# Patient Record
Sex: MALE | Race: NATIVE HAWAIIAN OR OTHER PACIFIC ISLANDER
[De-identification: names, ages, dates, MRNs, and addresses within clinical notes are randomized per-mention and may not be internally consistent; named-entity substitution may affect disease eponyms.]

---

## 2018-11-09 ENCOUNTER — HOSPITAL ENCOUNTER (INPATIENT)
Dept: HOSPITAL 31 - C.ER | Age: 53
LOS: 5 days | Discharge: ANOTHER HOSPITAL | DRG: 287 | End: 2018-11-14
Attending: INTERNAL MEDICINE | Admitting: INTERNAL MEDICINE
Payer: COMMERCIAL

## 2018-11-09 VITALS — BODY MASS INDEX: 22.4 KG/M2

## 2018-11-09 DIAGNOSIS — I10: ICD-10-CM

## 2018-11-09 DIAGNOSIS — I25.110: Primary | ICD-10-CM

## 2018-11-09 DIAGNOSIS — I35.2: ICD-10-CM

## 2018-11-09 DIAGNOSIS — E78.5: ICD-10-CM

## 2018-11-09 LAB
ALBUMIN SERPL-MCNC: 4.8 [, G/DL] (ref 3.5–5)
ALBUMIN/GLOB SERPL: 1.4 [,] (ref 1–2.1)
ALT SERPL-CCNC: 38 [, U/L] (ref 21–72)
AST SERPL-CCNC: 56 [, U/L] (ref 17–59)
BASOPHILS # BLD AUTO: 0 [, K/UL] (ref 0–0.2)
BASOPHILS NFR BLD: 0.5 [, %] (ref 0–2)
BNP SERPL-MCNC: 681 [, PG/ML] (ref 0–900)
BUN SERPL-MCNC: 18 [, MG/DL] (ref 9–20)
CALCIUM SERPL-MCNC: 9 [, MG/DL] (ref 8.6–10.4)
EOSINOPHIL # BLD AUTO: 0.4 [, K/UL] (ref 0–0.7)
EOSINOPHIL NFR BLD: 6 [, %] (ref 0–4)
ERYTHROCYTE [DISTWIDTH] IN BLOOD BY AUTOMATED COUNT: 13.2 [, %] (ref 11.5–14.5)
GFR NON-AFRICAN AMERICAN: > 60 [,]
HGB BLD-MCNC: 14.4 [, G/DL] (ref 12–18)
LYMPHOCYTES # BLD AUTO: 2.3 [, K/UL] (ref 1–4.3)
LYMPHOCYTES NFR BLD AUTO: 33.9 [, %] (ref 20–40)
MCH RBC QN AUTO: 27.9 [, PG] (ref 27–31)
MCHC RBC AUTO-ENTMCNC: 33.1 [, G/DL] (ref 33–37)
MCV RBC AUTO: 84.1 [, FL] (ref 80–94)
MONOCYTES # BLD: 0.5 [, K/UL] (ref 0–0.8)
MONOCYTES NFR BLD: 6.9 [, %] (ref 0–10)
NEUTROPHILS # BLD: 3.5 [, K/UL] (ref 1.8–7)
NEUTROPHILS NFR BLD AUTO: 52.7 [, %] (ref 50–75)
NRBC BLD AUTO-RTO: 0.2 [, %] (ref 0–2)
PLATELET # BLD: 219 [, K/UL] (ref 130–400)
PMV BLD AUTO: 8 [, FL] (ref 7.2–11.7)
RBC # BLD AUTO: 5.18 [, MIL/UL] (ref 4.4–5.9)
WBC # BLD AUTO: 6.7 [, K/UL] (ref 4.8–10.8)

## 2018-11-09 RX ADMIN — ENOXAPARIN SODIUM SCH MG: 60 INJECTION SUBCUTANEOUS at 22:46

## 2018-11-09 NOTE — C.PDOC
History Of Present Illness


54 y/o male presents to ED sent by Dr. Grant for evaluation of abnormal EKG and 

with c/o chest tightness intermittent for 1 month. Patient was sent to Dr. Grant 

office by PMD for evaluation of heart murmur. Patient states he took Aspirin 

81mg today and denies history of Stroke or MI. Patient denies dark or bloody 

stool, fever, chills, abdominal pain, back pain, nausea, vomiting, leg swelling 

or any other complaints at this time.


PMD: JOON Bailey


Cardiologist: Dr. Grant


Time Seen by Provider: 11/09/18 15:48


Chief Complaint (Nursing): Chest Pain


History Per: Patient


History/Exam Limitations: no limitations


Onset/Duration Of Symptoms: Days


Current Symptoms Are (Timing): Still Present





Past Medical History


Reviewed: Historical Data, Nursing Documentation, Vital Signs


Vital Signs: 





                                Last Vital Signs











Temp  99.1 F   11/09/18 15:40


 


Pulse  83   11/09/18 15:40


 


Resp  19   11/09/18 15:40


 


BP  136/96 H  11/09/18 15:40


 


Pulse Ox  99   11/09/18 15:40














- Medical History


PMH: No Chronic Diseases


Surgical History: No Surg Hx


Family History: States: No Known Family Hx





- Social History


Hx Alcohol Use: No


Hx Substance Use: No





- Immunization History


Hx Tetanus Toxoid Vaccination: No


Hx Influenza Vaccination: No


Hx Pneumococcal Vaccination: No





Review Of Systems


Constitutional: Negative for: Fever, Chills


Cardiovascular: Positive for: Chest Pain.  Negative for: Orthopnea


Respiratory: Negative for: Cough, Shortness of Breath


Gastrointestinal: Negative for: Nausea, Vomiting, Hematochezia





Physical Exam





- Physical Exam


Appears: Non-toxic, No Acute Distress


Skin: Warm, Dry, No Rash


Head: Atraumatic, Normacephalic


Eye(s): bilateral: Normal Inspection


Oral Mucosa: Moist


Neck: Supple


Cardiovascular: No Edema, Murmur (systolic)


Respiratory: Normal Breath Sounds, No Rales, No Rhonchi, No Wheezing


Gastrointestinal/Abdominal: Soft, No Tenderness, No Guarding, No Rebound


Extremity: Normal ROM, No Pedal Edema, Capillary Refill (<2 seconds)


Neurological/Psych: Oriented x3, Normal Speech (Speaking in full sentences), 

Normal Cognition





ED Course And Treatment





- Laboratory Results


Result Diagrams: 


                                 11/09/18 16:00





                                 11/09/18 16:00


O2 Sat by Pulse Oximetry: 99 (RA)


Pulse Ox Interpretation: Normal





Medical Decision Making


Medical Decision Making: 


Plan:CXR, ECG, Blood work, ordered








ECG: NSR @71BPM, ST ELEVATIONS IN V2, V3. T WAVE INVERSION IN LATERAL LEADS, V5,

V6 AND LEAD 1





Progress:


Discussed ECG with Dr. Grant states no STEMI at this time and to call him back 

with lab results. 





1715 


labs unremarkable. no dark or bloody stool per pt. Remained ASA given no 

mediastinal widening. 


appreciate consult w/ Dr. Grant: heparin to be started. pt in NAD 


appreciate consult w/ Dr. Block: to admit to his service 





Disposition





- Disposition


Disposition Time: 17:15


Condition: GOOD


Forms:  [facility] (English)





- Clinical Impression


Clinical Impression: 


 Unstable angina








- Scribe Statement


The provider has reviewed the documentation as recorded by the Scribe


Santos Mantilla





All medical record entries made by the Scribe were at my direction and 

personally dictated by me. I have reviewed the chart and agree that the record 

accurately reflects my personal performance of the history, physical exam, 

medical decision making, and the department course for this patient. I have also

personally directed, reviewed, and agree with the discharge instructions and 

disposition.

## 2018-11-09 NOTE — RAD
Date of service: 



11/09/2018



HISTORY:

Chest pain 



COMPARISON:

03/31/2015.



TECHNIQUE:

Chest PA and lateral



FINDINGS:



LINES AND TUBES:

None. 



LUNG AND PLEURA:

The lungs are well inflated and clear. No pleural effusion or 

pneumothorax.



HEART AND MEDIASTINUM:

The heart is not enlarged.  No aortic atherosclerotic calcification 

present.  The hilar and mediastinal contours are within normal limits.



SKELETAL STRUCTURES:

The bony structures are within normal limits for the patient's age.



VISUALIZED UPPER ABDOMEN:

Normal.



OTHER FINDINGS:

None.



IMPRESSION:

No active pulmonary disease.

## 2018-11-10 RX ADMIN — ENOXAPARIN SODIUM SCH: 60 INJECTION SUBCUTANEOUS at 09:07

## 2018-11-10 NOTE — CP.PCM.CON
History of Present Illness





- History of Present Illness


History of Present Illness: 





52 y/o male presented to chest tightness intermittent for 1 month. Patient was 

sent to my office by PMD for evaluation of heart murmur. Patient states he took 

Aspirin 81mg today and denies history of Stroke or MI. Patient denies dark or 

bloody stool, fever, chills, abdominal pain, back pain, nausea, vomiting, leg 

swelling or any other complaints at this time.


PMD: JOON Bailey





Chief Complaint (Nursing): Chest Pain


History Per: Patient


History/Exam Limitations: no limitations


Onset/Duration Of Symptoms: Days


Current Symptoms Are (Timing): Still Present








- Medical History


PMH: No Chronic Diseases


Surgical History: No Surg Hx


Family History: States: No Known Family Hx





- Social History


Hx Alcohol Use: No


Hx Substance Use: No





- Immunization History


Hx Tetanus Toxoid Vaccination: No


Hx Influenza Vaccination: No


Hx Pneumococcal Vaccination: No





 Review Of Systems 


Constitutional: Negative for: Fever, Chills


Cardiovascular: Positive for: Chest Pain.  Negative for: Orthopnea


Respiratory: Negative for: Cough, Shortness of Breath


Gastrointestinal: Negative for: Nausea, Vomiting, Hematochezia





 Physical Exam 





- Physical Exam


Appears: Non-toxic, No Acute Distress


Skin: Warm, Dry, No Rash


Head: Atraumatic, Normacephalic


Eye(s): bilateral: Normal Inspection


Oral Mucosa: Moist


Neck: Supple


Cardiovascular: No Edema, Murmur (systolic)


Respiratory: Normal Breath Sounds, No Rales, No Rhonchi, No Wheezing


Gastrointestinal/Abdominal: Soft, No Tenderness, No Guarding, No Rebound


Extremity: Normal ROM, No Pedal Edema, Capillary Refill (<2 seconds)


Neurological/Psych: Oriented x3, Normal Speech (Speaking in full sentences), 

Normal Cognition








Past Patient History





- Tetanus Immunizations


Tetanus Immunization: Unknown





- Past Medical History & Family History


Past Medical History?: No





- Past Social History


Smoking Status: Never Smoked





- MUSCULOSKELETAL/RHEUMATOLOGICAL


Hx Falls: No





- PSYCHIATRIC


Hx Substance Use: No





- SURGICAL HISTORY


Hx Surgeries: No





- ANESTHESIA


Hx Anesthesia: No





Meds


Allergies/Adverse Reactions: 


                                    Allergies











Allergy/AdvReac Type Severity Reaction Status Date / Time


 


No Known Allergies Allergy   Verified 11/09/18 15:45














- Medications


Medications: 


                               Current Medications





Enoxaparin Sodium (Lovenox)  60 mg SC Q12 [facility]


   Last Admin: 11/10/18 09:07 Dose:  Not Given


Metoprolol Tartrate (Lopressor)  [facility]


   Last Admin: 11/10/18 17:54 Dose:  25 mg











Results





- Vital Signs


Recent Vital Signs: 


                                Last Vital Signs











Temp  97.7 F   11/10/18 15:00


 


Pulse  65   11/10/18 16:29


 


Resp  20   11/10/18 15:00


 


BP  129/89   11/10/18 17:54


 


Pulse Ox  100   11/10/18 15:00














- Labs


Result Diagrams: 


                                 11/09/18 16:00





                                 11/09/18 16:00





Assessment & Plan





- Assessment and Plan (Free Text)


Assessment: 





Severe symptomatic Aortic stenosis


Likely Bicuspid valve





Need to r/o aortic pathology including Aortic aneurysm, Dissection and 

Coarctation


CT angio chest 


R/O CAD








Recommend AVR


Cardiac cath Monday

## 2018-11-10 NOTE — CP.PCM.HP
History of Present Illness





- History of Present Illness


History of Present Illness: 





CC: abnormal EKG 


HPI: 53 year old male , no past medical illness. Seen in ER for abnormal EKG> 

feels anxious.





Present on Admission





- Present on Admission


Any Indicators Present on Admission: Yes


History of DVT/PE: No


History of Uncontrolled Diabetes: No


Urinary Catheter: No


Decubitus Ulcer Present: No


History Surgical Site Infection Following: None





Review of Systems





- Review of Systems


All systems: reviewed and no additional remarkable complaints except (no fever. 

No chestpain. No SOB)





Past Patient History





- Tetanus Immunizations


Tetanus Immunization: Unknown





- Past Medical History & Family History


Past Medical History?: No





- Past Social History


Smoking Status: Never Smoked





- MUSCULOSKELETAL/RHEUMATOLOGICAL


Hx Falls: No





- PSYCHIATRIC


Hx Substance Use: No





- SURGICAL HISTORY


Hx Surgeries: No





- ANESTHESIA


Hx Anesthesia: No





Meds


Allergies/Adverse Reactions: 


                                    Allergies











Allergy/AdvReac Type Severity Reaction Status Date / Time


 


No Known Allergies Allergy   Verified 11/09/18 15:45














Physical Exam





- Constitutional


Appears: No Acute Distress





- Head Exam


Head Exam: NORMAL INSPECTION





- Eye Exam


Eye Exam: Normal appearance





- ENT Exam


ENT Exam: Normal Exam





- Neck Exam


Neck exam: Positive for: Normal Inspection





- Respiratory Exam


Respiratory Exam: NORMAL BREATHING PATTERN





- Cardiovascular Exam


Cardiovascular Exam: REGULAR RHYTHM





- GI/Abdominal Exam


GI & Abdominal Exam: Soft





- Rectal Exam


Rectal Exam: Deferred





- Extremities Exam


Extremities exam: Positive for: normal inspection





- Neurological Exam


Neurological exam: Alert





Results





- Vital Signs


Recent Vital Signs: 





                                Last Vital Signs











Temp  97.8 F   11/10/18 08:10


 


Pulse  68   11/10/18 08:10


 


Resp  18   11/10/18 08:10


 


BP  131/89   11/10/18 08:10


 


Pulse Ox  97   11/10/18 08:10














- Labs


Result Diagrams: 


                                 11/09/18 16:00





                                 11/11/18 07:47


Labs: 





                         Laboratory Results - last 24 hr











  11/09/18 11/09/18





  16:00 16:00


 


WBC  6.7 


 


RBC  5.18 


 


Hgb  14.4 


 


Hct  43.6 


 


MCV  84.1 


 


MCH  27.9 


 


MCHC  33.1 


 


RDW  13.2 


 


Plt Count  219 


 


MPV  8.0 


 


Neut % (Auto)  52.7 


 


Lymph % (Auto)  33.9 


 


Mono % (Auto)  6.9 


 


Eos % (Auto)  6.0 H 


 


Baso % (Auto)  0.5 


 


Neut # (Auto)  3.5 


 


Lymph # (Auto)  2.3 


 


Mono # (Auto)  0.5 


 


Eos # (Auto)  0.4 


 


Baso # (Auto)  0.0 


 


Sodium   138


 


Potassium   4.8


 


Chloride   100


 


Carbon Dioxide   29


 


Anion Gap   14


 


BUN   18


 


Creatinine   0.9


 


Est GFR ( Amer)   > 60


 


Est GFR (Non-Af Amer)   > 60


 


Random Glucose   98


 


Calcium   9.0


 


Magnesium   2.1


 


Total Bilirubin   0.9


 


AST   56


 


ALT   38


 


Alkaline Phosphatase   53


 


Troponin I   0.0390


 


NT-Pro-B Natriuret Pep   681


 


Total Protein   8.2


 


Albumin   4.8


 


Globulin   3.4


 


Albumin/Globulin Ratio   1.4














Assessment & Plan


(1) Unstable angina


Status: Acute   





- Assessment and Plan (Free Text)


Assessment: 





A/P: Continue medications. Cardiology Dr. Grant





- Date & Time


Date: 11/10/18


Time: 15:40

## 2018-11-11 LAB
BUN SERPL-MCNC: 16 [, MG/DL] (ref 9–20)
CALCIUM SERPL-MCNC: 9 [, MG/DL] (ref 8.6–10.4)
GFR NON-AFRICAN AMERICAN: > 60 [,]
HDLC SERPL-MCNC: 38 [, MG/DL] (ref 30–70)
LDLC SERPL-MCNC: 144 [, MG/DL] (ref 0–129)
TROPONIN I SERPL-MCNC: 0.03 [, NG/ML] (ref 0–0.12)

## 2018-11-11 RX ADMIN — ENOXAPARIN SODIUM SCH: 40 INJECTION SUBCUTANEOUS at 09:22

## 2018-11-11 NOTE — CP.PCM.PN
Subjective





- Date & Time of Evaluation


Date of Evaluation: 11/11/18


Time of Evaluation: 15:23





- Subjective


Subjective: 





S: Feels marii.





Objective





- Vital Signs/Intake and Output


Vital Signs (last 24 hours): 


                                        











Temp Pulse Resp BP Pulse Ox


 


 97.9 F   56 L  18   120/80   97 


 


 11/11/18 07:00  11/11/18 07:00  11/11/18 07:00  11/11/18 07:59  11/11/18 07:00








Intake and Output: 


                                        











 11/11/18 11/11/18





 06:59 18:59


 


Intake Total  600


 


Balance  600














- Medications


Medications: 


                               Current Medications





Aspirin (Ecotrin)  81 mg PO DAILY [facility]


   Last Admin: 11/11/18 09:21 Dose:  81 mg


Enoxaparin Sodium (Lovenox)  40 mg SC DAILY [facility]


   Last Admin: 11/11/18 09:22 Dose:  Not Given


Metoprolol Tartrate (Lopressor)  25 mg PO BIDAC [facility]


   Last Admin: 11/11/18 07:59 Dose:  25 mg











- Labs


Labs: 


                                        





                                 11/09/18 16:00 





                                 11/11/18 07:47 











- Constitutional


Appears: No Acute Distress





- Head Exam


Head Exam: NORMAL INSPECTION





- Eye Exam


Eye Exam: Normal appearance





- ENT Exam


ENT Exam: Normal Exam





- Neck Exam


Neck Exam: Normal Inspection





- Respiratory Exam


Respiratory Exam: NORMAL BREATHING PATTERN





- Cardiovascular Exam


Cardiovascular Exam: REGULAR RHYTHM





- GI/Abdominal Exam


GI & Abdominal Exam: Soft





- Rectal Exam


Rectal Exam: Deferred





- Extremities Exam


Extremities Exam: Normal Inspection





- Neurological Exam


Neurological Exam: Alert





Assessment and Plan


(1) Unstable angina


Status: Acute   





- Assessment and Plan (Free Text)


Assessment: 





A/P: Continue medications. For Cardiac cath

## 2018-11-11 NOTE — CP.PCM.PN
Subjective





- Date & Time of Evaluation


Date of Evaluation: 11/11/18


Time of Evaluation: 12:05





- Subjective


Subjective: 


Patient seen and evaluated


Denies chest pain and dyspnea





 Review Of Systems 


Constitutional: Negative for: Fever, Chills


Cardiovascular: Positive for: Chest Pain.  Negative for: Orthopnea


Respiratory: Negative for: Cough, Shortness of Breath


Gastrointestinal: Negative for: Nausea, Vomiting, Hematochezia





 Physical Exam 





- Physical Exam


Appears: Non-toxic, No Acute Distress


Skin: Warm, Dry, No Rash


Head: Atraumatic, Normacephalic


Eye(s): bilateral: Normal Inspection


Oral Mucosa: Moist


Neck: Supple


Cardiovascular: No Edema, Murmur (systolic)


Respiratory: Normal Breath Sounds, No Rales, No Rhonchi, No Wheezing


Gastrointestinal/Abdominal: Soft, No Tenderness, No Guarding, No Rebound


Extremity: Normal ROM, No Pedal Edema, Capillary Refill (<2 seconds)


Neurological/Psych: Oriented x3, Normal Speech (Speaking in full sentences), 

Normal Cognition











Objective





- Vital Signs/Intake and Output


Vital Signs (last 24 hours): 


                                        











Temp Pulse Resp BP Pulse Ox


 


 98.2 F   74   20   115/83   99 


 


 11/11/18 15:00  11/11/18 15:39  11/11/18 15:00  11/11/18 17:44  11/11/18 15:00








Intake and Output: 


                                        











 11/11/18 11/12/18





 18:59 06:59


 


Intake Total 600 


 


Balance 600 














- Medications


Medications: 


                               Current Medications





Aspirin (Ecotrin)  81 mg PO DAILY [facility]


   Last Admin: 11/11/18 09:21 Dose:  81 mg


Enoxaparin Sodium (Lovenox)  40 mg SC DAILY [facility]


   Last Admin: 11/11/18 09:22 Dose:  Not Given


Metoprolol Tartrate (Lopressor)  25 mg PO BIDAC [facility]


   Last Admin: 11/11/18 17:44 Dose:  25 mg











- Labs


Labs: 


                                        





                                 11/09/18 16:00 





                                 11/11/18 07:47 











Assessment and Plan





- Assessment and Plan (Free Text)


Assessment: 





Severe symptomatic AS


CT angio negative for Aortic aneurysm


Hyperlipidemia





Cardiac cath tomorrow afternoon


Recommend AVR as soon as possible

## 2018-11-11 NOTE — CT
PROCEDURE:  CT Angiography Chest, Abdomen and Pelvis with and without 

intravenous contrast



HISTORY:

R/O Thoracic and Abd Aorta Aneurysm and dissection



COMPARISON:

None.



TECHNIQUE:

Contiguous axial images of the chest, abdomen and pelvis were 

obtained in the phase of aortic enhancement.  A noncontrast enhanced 

CT of the chest was also obtained to evaluate for possible intramural 

thrombus.  Coronal and sagittal reformats were generated. 



IV dose administered:  100 cc Visipaque 320 



Radiation dose:



Total exam DLP = 626.78 mGy-cm.



This CT exam was performed using one or more of the following dose 

reduction techniques: Automated exposure control, adjustment of the 

mA and/or kV according to patient size, and/or use of iterative 

reconstruction technique.



FINDINGS:



CT ANGIOGRAPHY OF THE CHEST WITH & WITHOUT CONTRAST:



AORTA (CHEST AND ABDOMEN):  The ascending thoracic aorta measures 

approximately 3.75 cm and descending thoracic aorta measures 

approximately 2.2 cm..  Origins of the and proximal margins of the 

great vessels are patent.  Pulmonary trunk measures approximately 2.5 

cm.  



The thoracic and abdominal aorta are unremarkable, without aneurysm, 

dissection or rupture. No intramural thrombus identified in the 

thoracic aorta on the non-contrast ct of the chest.



The celiac axis, superior mesenteric artery, inferior mesenteric 

artery and the renal arteries are also widely patent.



The pelvic arteries are patent.  Note made of mild partially 

calcified atherosclerotic plaque changes along the internal iliac 

arteries.. 



LUNGS:  Minimal passive/dependent type atelectasis both posterior 

lower lung fields left greater than right..  No nodule, mass or 

consolidation.



MEDIASTINUM:  Heart is enlarged with left ventricular configuration.  

No significant pericardial effusion. 



LYMPH NODES:  No significant mediastinal or hilar adenopathy. 



Trachea is midline and patent with no large central endoluminal 

lesions. 



There is a small hiatal hernia. 



PLEURA:  Unremarkable. No pneumothorax. No pleural fluid.  Few tiny 

blebs are present both lung apices 



BONES:  Minor multilevel degenerative spondylosis of the thoracic 

spine.



OTHER FINDINGS:  None.



CT ANGIOGRAPHY OF THE ABDOMEN AND PELVIS WITH CONTRAST:  



LIVER:  Unremarkable. No gross lesion or ductal dilatation. 



GALLBLADDER AND BILE DUCTS:  Unremarkable. 



PANCREAS:  Unremarkable. No gross lesion or ductal dilatation.



SPLEEN:  Unremarkable. 



ADRENALS:  Unremarkable. No mass. 



KIDNEYS AND URETERS:  Unremarkable. No hydronephrosis. No solid mass. 



VASCULATURE:  Unremarkable. No aortic aneurysm.  Minor aortic 

atherosclerotic calcification or mural plaque present.



STOMACH AND BOWEL:  Unremarkable. No obstruction. No gross mural 

thickening. 



APPENDIX:  Normal appendix. 



PERITONEUM:  Unremarkable. No free fluid. No free air. 



LYMPH NODES:  Unremarkable. No enlarged lymph nodes. 



BLADDER:  Incompletely visualized though distended.  No obvious 

intraluminal calculi. 



REPRODUCTIVE:  Not visualized 



BONES:  Visualized osseous structures appear intact.  Minor 

multilevel degenerative spondylosis of the lumbar spine 



OTHER FINDINGS:  None.



IMPRESSION:

No evidence of aortic dissection.  Ascending thoracic aorta measures 

approximately 3.7 cm. 



Mild cardiomegaly with LVH.



Minor calcified atherosclerotic plaque seen along the abdominal aorta 

as well as the internal iliac arteries.

## 2018-11-12 LAB
INR PPP: 1 [,]
PROTHROMBIN TIME: 11.1 [, SECONDS] (ref 9.7–12.2)

## 2018-11-12 PROCEDURE — 4A023N7 MEASUREMENT OF CARDIAC SAMPLING AND PRESSURE, LEFT HEART, PERCUTANEOUS APPROACH: ICD-10-PCS | Performed by: INTERNAL MEDICINE

## 2018-11-12 PROCEDURE — B2111ZZ FLUOROSCOPY OF MULTIPLE CORONARY ARTERIES USING LOW OSMOLAR CONTRAST: ICD-10-PCS | Performed by: INTERNAL MEDICINE

## 2018-11-12 PROCEDURE — B3101ZZ FLUOROSCOPY OF THORACIC AORTA USING LOW OSMOLAR CONTRAST: ICD-10-PCS | Performed by: INTERNAL MEDICINE

## 2018-11-12 RX ADMIN — ENOXAPARIN SODIUM SCH: 40 INJECTION SUBCUTANEOUS at 09:11

## 2018-11-12 NOTE — CP.PCM.PN
Subjective





- Date & Time of Evaluation


Date of Evaluation: 11/12/18


Time of Evaluation: 07:35





- Subjective


Subjective: 





Pt feels well; gets intermittent CP but he attribute c/o nervous.


No cough, no SOB, no edema, no diarrhea, no urinary complain





Objective





- Vital Signs/Intake and Output


Vital Signs (last 24 hours): 


                                        











Temp Pulse Resp BP Pulse Ox


 


 97.7 F   55 L  18   119/83   97 


 


 11/12/18 07:00  11/12/18 07:30  11/12/18 07:00  11/12/18 07:00  11/12/18 07:00











- Medications


Medications: 


                               Current Medications





Aspirin (Ecotrin)  81 mg PO DAILY [facility]


   Last Admin: 11/11/18 09:21 Dose:  81 mg


Enoxaparin Sodium (Lovenox)  40 mg SC DAILY [facility]


   Last Admin: 11/11/18 09:22 Dose:  Not Given


Metoprolol Tartrate (Lopressor)  25 mg PO BIDAC [facility]


   Last Admin: 11/11/18 17:44 Dose:  25 mg











- Labs


Labs: 


                                        





                                 11/09/18 16:00 





                                 11/11/18 07:47 











- Constitutional


Appears: No Acute Distress





- Eye Exam


Eye Exam: Normal appearance





- ENT Exam


ENT Exam: Mucous Membranes Moist





- Neck Exam


Neck Exam: Full ROM.  absent: Normal Inspection, Thyromegaly





- Respiratory Exam


Respiratory Exam: Clear to Ausculation Bilateral.  absent: Rales, Rhonchi, 

Wheezes





- Cardiovascular Exam


Cardiovascular Exam: REGULAR RHYTHM, +S1, +S2, Murmur.  absent: Gallop, JVD





- GI/Abdominal Exam


GI & Abdominal Exam: Soft.  absent: Tenderness





- Extremities Exam


Extremities Exam: Full ROM, Normal Capillary Refill.  absent: Joint Swelling, 

Pedal Edema





Assessment and Plan





- Assessment and Plan (Free Text)


Assessment: 





Aortic Stenosis; CP/Palpitation





Discuss plan with pt - want cath


Want AV replacement but want to go home c/o business to attend.


Cont meds

## 2018-11-12 NOTE — CP.PCM.CON
History of Present Illness





- History of Present Illness


History of Present Illness: 





patient with Severe aortic stenosis and CAD (95%L main,85%L circumflex,95% RCA) 

transferred to ICU following Cardiac Cath


52 y/o male with no significant PMH referred to Cardiologist for heart 

murmur.H/o exertional dyspnea(ET 1-2 blocks) and intermittent chest pain x 1 

month


No chest pain,palpitations at present time








Review of Systems





- Constitutional


Constitutional: absent: Fatigue, Fever, Weakness





- EENT


Eyes: absent: Diplopia, Spots in Vision


Ears: absent: Ear Pain, Dizziness


Nose/Mouth/Throat: absent: Nasal Congestion, Sore Throat





- Cardiovascular


Cardiovascular: absent: Claudication, Pain Radiating to Arm/Neck/Jaw, Leg Edema,

Palpitations, Pedal Edema, Radiating Pain


Additional comments: 





dyspnea on exertion and intermittent chest pain





- Respiratory


Respiratory: Dyspnea on Exertion.  absent: Cough





- Gastrointestinal


Gastrointestinal: absent: Abdominal Pain, Coffee Ground Emesis, Diarrhea, 

Heartburn, Nausea, Vomiting





- Genitourinary


Genitourinary: absent: Difficulty Urinating, Dysuria





- Musculoskeletal


Musculoskeletal: absent: Muscle Weakness, Stiffness





- Integumentary


Integumentary: absent: Bleeding Lesions





- Neurological


Neurological: absent: Focal Weakness, Headaches





- Endocrine


Endocrine: absent: Polyuria





- Hematologic/Lymphatic


Hematologic: absent: Easy Bleeding, Easy Bruising





Past Patient History





- Tetanus Immunizations


Tetanus Immunization: Unknown





- Past Medical History & Family History


Past Medical History?: No





- Past Social History


Smoking Status: Never Smoked


Drugs: Denies





- MUSCULOSKELETAL/RHEUMATOLOGICAL


Hx Falls: No





- PSYCHIATRIC


Hx Substance Use: No





- SURGICAL HISTORY


Hx Surgeries: No





- ANESTHESIA


Hx Anesthesia: No





Meds


Allergies/Adverse Reactions: 


                                    Allergies











Allergy/AdvReac Type Severity Reaction Status Date / Time


 


No Known Allergies Allergy   Verified 11/09/18 15:45














- Medications


Medications: 


                               Current Medications





Aspirin (Ecotrin)  81 mg PO DAILY [facility]


   Last Admin: 11/12/18 09:09 Dose:  81 mg


Enoxaparin Sodium (Lovenox)  80 mg SC Q12 [facility]


Metoprolol Tartrate (Lopressor)  25 mg PO BIDAC [facility]


   Last Admin: 11/12/18 17:41 Dose:  25 mg











Physical Exam





- Constitutional


Appears: Non-toxic, No Acute Distress





- Head Exam


Head Exam: ATRAUMATIC, NORMAL INSPECTION, NORMOCEPHALIC





- Eye Exam


Eye Exam: EOMI, PERRL





- ENT Exam


ENT Exam: Mucous Membranes Moist





- Neck Exam


Neck exam: Positive for: Normal Inspection





- Respiratory Exam


Respiratory Exam: Clear to Auscultation Bilateral





- Cardiovascular Exam


Cardiovascular Exam: REGULAR RHYTHM, Systolic Murmur.  absent: JVD





- GI/Abdominal Exam


GI & Abdominal Exam: Normal Bowel Sounds, Soft.  absent: Tenderness





- Back Exam


Back exam: NORMAL INSPECTION





- Neurological Exam


Neurological exam: Alert, CN II-XII Intact, Oriented x3





- Psychiatric Exam


Psychiatric exam: Normal Affect





- Skin


Skin Exam: Intact, Normal Color





Results





- Vital Signs


Recent Vital Signs: 


                                Last Vital Signs











Temp  97.5 F L  11/12/18 15:51


 


Pulse  64   11/12/18 15:51


 


Resp  20   11/12/18 15:51


 


BP  125/75   11/12/18 17:41


 


Pulse Ox  98   11/12/18 15:51














- Labs


Result Diagrams: 


                                 11/09/18 16:00





                                 11/11/18 07:47


Labs: 


                         Laboratory Results - last 24 hr











  11/12/18





  07:14


 


PT  11.1


 


INR  1.0














- Imaging and Cardiology


  ** Chest x-ray


Status: Image reviewed by me





Assessment & Plan





- Assessment and Plan (Free Text)


Assessment: 





CAD and Severe Aortic stenosis


Continue lovenox,beta blocker


Possible transfer in am for Surgery

## 2018-11-13 VITALS — RESPIRATION RATE: 20 BRPM

## 2018-11-13 LAB
ALBUMIN SERPL-MCNC: 4.5 [, G/DL] (ref 3.5–5)
ALBUMIN/GLOB SERPL: 1.5 [,] (ref 1–2.1)
ALT SERPL-CCNC: 32 [, U/L] (ref 21–72)
AST SERPL-CCNC: 28 [, U/L] (ref 17–59)
BUN SERPL-MCNC: 16 [, MG/DL] (ref 9–20)
CALCIUM SERPL-MCNC: 9.1 [, MG/DL] (ref 8.6–10.4)
ERYTHROCYTE [DISTWIDTH] IN BLOOD BY AUTOMATED COUNT: 13.7 [, %] (ref 11.5–14.5)
GFR NON-AFRICAN AMERICAN: > 60 [,]
HGB BLD-MCNC: 14.6 [, G/DL] (ref 12–18)
MCH RBC QN AUTO: 28 [, PG] (ref 27–31)
MCHC RBC AUTO-ENTMCNC: 33.5 [, G/DL] (ref 33–37)
MCV RBC AUTO: 83.4 [, FL] (ref 80–94)
PLATELET # BLD: 220 [, K/UL] (ref 130–400)
PMV BLD AUTO: 8.3 [, FL] (ref 7.2–11.7)
RBC # BLD AUTO: 5.21 [, MIL/UL] (ref 4.4–5.9)
WBC # BLD AUTO: 7.1 [, K/UL] (ref 4.8–10.8)

## 2018-11-13 RX ADMIN — PANTOPRAZOLE SODIUM SCH MG: 40 TABLET, DELAYED RELEASE ORAL at 10:56

## 2018-11-13 RX ADMIN — ENOXAPARIN SODIUM SCH MG: 60 INJECTION SUBCUTANEOUS at 10:56

## 2018-11-13 RX ADMIN — ENOXAPARIN SODIUM SCH MG: 60 INJECTION SUBCUTANEOUS at 21:23

## 2018-11-13 NOTE — CARDCATH
PROCEDURE DATE:  11/12/2018



PROCEDURES:

1.  Left heart catheterization.

2.  Coronary angiogram.

3.  Aortic root angiogram.



CLINICAL INDICATIONS:

1.  Dyspnea and chest pain with minimal exertion.

2.  Severe aortic stenosis.

3.  Hypertension.



REFERRING PHYSICIAN:  Rizwan Block MD



PERFORMING PHYSICIAN:  Moustapha Grant MD



BRIEF CLINICAL HISTORY:  Ambrosio Cao is a 53-year-old [place]

gentleman presented to my office with the complaints of chest heaviness and

dyspnea with minimal exertion.  The patient was subsequently transferred to

[facility] for unstable angina.  The 2D echo has revealed severe

aortic stenosis and normal LV function.  The patient is scheduled for

cardiac cath today for further management.



DESCRIPTION OF PROCEDURE:  After informed consent, the patient was prepped

and draped in the usual sterile fashion.  A 2% lidocaine was given in the

right wrist for local anesthesia.



A JR4 6-Ghanaian diagnostic catheter was engaged with right coronary artery. 

Contrast injected, and right coronary angiogram was done.  Then, the

catheter was exchanged to 6-Ghanaian Tiger catheter.  The catheter was

engaged into the left main coronary artery.  Contrast injected, and left

coronary angiogram was done.



Then, the catheter was exchanged to 6-Ghanaian pigtail catheter.  Using the

power injector, aortic root angiogram was done.



The patient tolerated the procedure well.  Postprocedure Terumo radial band

applied to right wrist with excellent hemostasis.  Radiological supervision

and radiological interpretation of the coronary imaging was done.



FINDINGS:

1.  Distal left main coronary artery has a critical 95% stenosis.  The

patient has a trifurcation disease with ostial LAD, ostial ramus, and

ostial circumflex disease.

2.  Ostial LAD has a 90% stensois.  Mid LAD has a 95-98% stenosis.  Distal

LAD and diagonal branches are patent.

3.  Ramus has a 90% ostial stenosis.  Mid ramus has a 70% concentric

stenosis.

4.  Left circumflex has a 90% ostial stenosis.  Distal circumflex has a 95%

eccentric stenosis.

5.  Right coronary artery is dominant.  Mid right coronary artery has a 95%

critical stenosis.

6.  Aortic root angiogram has revealed mild aortic regurgitation.  No

aneurysm noted.

7.  LV ejection fraction by echo is normal.  The patient has

severe-to-critical aortic stenosis by echo.  Peak gradient was more than 60

mmHg and mean gradients of 45 mmHg by echo.



IMPRESSION:

1.  Critical left main and triple-vessel disease.

2.  Severe-to-critical aortic stenosis.



RECOMMENDATIONS:  Recommend urgent coronary artery bypass surgery and

aortic valve replacement.





__________________________________________

Moustapha Grant MD





DD:  11/13/2018 5:44:03

DT:  11/13/2018 6:43:08

Job # [id number]

## 2018-11-13 NOTE — CP.PCM.PN
Subjective





- Date & Time of Evaluation


Date of Evaluation: 11/12/18


Time of Evaluation: 22:20





- Subjective


Subjective: 





Patient s/p cardiac Cath





1. Severe Left Main and Triple vessel disease


2. Severe AS by ECHO





Recommend urgent CABG and SAVR





d/w patient and his partner


They would like to go home first and think about it





Advised against the discharge


Explained the risk of sudden cardiac death with even mild exertion





Transfer to ICU


Full anticoagulation


Oxygen, ASA, Statins and B blockers


Bed rest





As per patient wishes for second opinion another cardiologist consulted


Will follow


Contacted Dr. Duron (CT surgeon) at [facility] for immediate transfer





Objective





- Vital Signs/Intake and Output


Vital Signs (last 24 hours): 


                                        











Temp Pulse Resp BP Pulse Ox


 


 97.8 F   56 L  15   105/77   99 


 


 11/13/18 00:00  11/13/18 03:22  11/13/18 03:22  11/13/18 03:22  11/13/18 03:22








Intake and Output: 


                                        











 11/12/18 11/13/18





 18:59 06:59


 


Intake Total  120


 


Output Total  150


 


Balance  -30














- Medications


Medications: 


                               Current Medications





Aspirin (Ecotrin)  81 mg PO DAILY [facility]


   Last Admin: 11/12/18 09:09 Dose:  81 mg


Enoxaparin Sodium (Lovenox)  60 mg SC Q12 [facility]


Metoprolol Tartrate (Lopressor)  25 mg PO BIDAC [facility]


   Last Admin: 11/12/18 17:41 Dose:  25 mg


Pantoprazole Sodium (Protonix Ec Tab)  40 mg PO DAILY [facility]


Rosuvastatin Calcium (Crestor)  20 mg PO HS [facility]











- Labs


Labs: 


                                        





                                 11/13/18 05:03 





                                 11/11/18 07:47 





                                        











PT  11.1 SECONDS (9.7-12.2)   11/12/18  07:14    


 


INR  1.0   11/12/18  07:14

## 2018-11-13 NOTE — CP.PCM.PN
Subjective





- Date & Time of Evaluation


Date of Evaluation: 11/13/18


Time of Evaluation: 08:50





- Subjective


Subjective: 





Pt feels well; no CP but admits has CP at work and will rest. Then goes back to 

work.


No cough, no SOB, no diarrhea, no edema





Objective





- Vital Signs/Intake and Output


Vital Signs (last 24 hours): 


                                        











Temp Pulse Resp BP Pulse Ox


 


 98.1 F   61   14   115/88   100 


 


 11/13/18 08:00  11/13/18 08:06  11/13/18 08:06  11/13/18 08:06  11/13/18 08:06








Intake and Output: 


                                        











 11/13/18 11/13/18





 06:59 18:59


 


Intake Total 120 


 


Output Total 400 


 


Balance -280 














- Medications


Medications: 


                               Current Medications





Aspirin (Ecotrin)  81 mg PO DAILY [facility]


   Last Admin: 11/12/18 09:09 Dose:  81 mg


Enoxaparin Sodium (Lovenox)  60 mg SC Q12 [facility]


Metoprolol Tartrate (Lopressor)  25 mg PO BIDAC [facility]


   Last Admin: 11/13/18 07:40 Dose:  Not Given


Pantoprazole Sodium (Protonix Ec Tab)  40 mg PO DAILY [facility]


Rosuvastatin Calcium (Crestor)  20 mg PO HS [facility]











- Labs


Labs: 


                                        





                                 11/13/18 05:03 





                                 11/13/18 05:03 





                                        











PT  11.1 SECONDS (9.7-12.2)   11/12/18  07:14    


 


INR  1.0   11/12/18  07:14    














- Constitutional


Appears: No Acute Distress





- Eye Exam


Eye Exam: Normal appearance





- ENT Exam


ENT Exam: Mucous Membranes Moist





- Neck Exam


Neck Exam: Full ROM.  absent: Thyromegaly





- Respiratory Exam


Respiratory Exam: Clear to Ausculation Bilateral.  absent: Rales, Rhonchi, 

Wheezes





- Cardiovascular Exam


Cardiovascular Exam: REGULAR RHYTHM, +S1, +S2, Murmur.  absent: Gallop, JVD





- GI/Abdominal Exam


GI & Abdominal Exam: Soft, Normal Bowel Sounds.  absent: Tenderness





- Extremities Exam


Extremities Exam: Full ROM, Normal Capillary Refill.  absent: Calf Tenderness, 

Joint Swelling, Pedal Edema





Assessment and Plan





- Assessment and Plan (Free Text)


Plan: 





Chest pain w/ CAD; Severe Aortic Stenosis





Discuss w/ patient ; Waiting for wife to arrive from [facility]

## 2018-11-13 NOTE — CP.PCM.CON
History of Present Illness





- History of Present Illness


History of Present Illness: 








patient with Severe aortic stenosis and CAD (95%L main,85%L circumflex,95% RCA) 

transferred to ICU following Cardiac Cath


52 y/o male with no significant PMH referred to Cardiologist for heart 

murmur.H/o exertional dyspnea(ET 1-2 blocks) and intermittent chest pain x 1 

month


No chest pain,palpitations at present time. Sitting comfortably in bed and 

family bed side.








Past Patient History





- Tetanus Immunizations


Tetanus Immunization: Unknown





- Past Medical History & Family History


Past Medical History?: No





- Past Social History


Smoking Status: Never Smoked


Drugs: Denies





- MUSCULOSKELETAL/RHEUMATOLOGICAL


Hx Falls: No





- PSYCHIATRIC


Hx Substance Use: No





- SURGICAL HISTORY


Hx Surgeries: No





- ANESTHESIA


Hx Anesthesia: No





Meds


Allergies/Adverse Reactions: 


                                    Allergies











Allergy/AdvReac Type Severity Reaction Status Date / Time


 


No Known Allergies Allergy   Verified 11/09/18 15:45














- Medications


Medications: 


                               Current Medications





Aspirin (Ecotrin)  81 mg PO DAILY [facility]


   Last Admin: 11/13/18 10:56 Dose:  81 mg


Enoxaparin Sodium (Lovenox)  60 mg SC Q12 [facility]


   Last Admin: 11/13/18 10:56 Dose:  60 mg


Metoprolol Tartrate (Lopressor)  25 mg PO BIDAC [facility]


   Last Admin: 11/13/18 07:40 Dose:  Not Given


Pantoprazole Sodium (Protonix Ec Tab)  40 mg PO DAILY [facility]


   Last Admin: 11/13/18 10:56 Dose:  40 mg


Rosuvastatin Calcium (Crestor)  20 mg PO HS [facility]











Physical Exam





- Head Exam


Head Exam: NORMOCEPHALIC





- Neck Exam


Neck exam: Positive for: Normal Inspection





- Respiratory Exam


Respiratory Exam: NORMAL BREATHING PATTERN





- Cardiovascular Exam


Cardiovascular Exam: REGULAR RHYTHM, Systolic Murmur





- Extremities Exam


Extremities exam: Positive for: normal inspection





- Neurological Exam


Neurological exam: Alert, Oriented x3





Results





- Vital Signs


Recent Vital Signs: 


                                Last Vital Signs











Temp  98.1 F   11/13/18 08:00


 


Pulse  74   11/13/18 11:06


 


Resp  15   11/13/18 11:06


 


BP  137/105 H  11/13/18 11:06


 


Pulse Ox  100   11/13/18 11:06














- Labs


Result Diagrams: 


                                 11/13/18 05:03





                                 11/13/18 05:03


Labs: 


                         Laboratory Results - last 24 hr











  11/13/18 11/13/18





  05:03 05:03


 


WBC  7.1 


 


RBC  5.21 


 


Hgb  14.6 


 


Hct  43.5 


 


MCV  83.4 


 


MCH  28.0 


 


MCHC  33.5 


 


RDW  13.7 


 


Plt Count  220 


 


MPV  8.3 


 


Sodium   138


 


Potassium   3.9


 


Chloride   101


 


Carbon Dioxide   28


 


Anion Gap   13


 


BUN   16


 


Creatinine   0.8


 


Est GFR ( Amer)   > 60


 


Est GFR (Non-Af Amer)   > 60


 


Random Glucose   92


 


Calcium   9.1


 


Phosphorus   3.9


 


Magnesium   2.2


 


Total Bilirubin   0.9


 


AST   28


 


ALT   32


 


Alkaline Phosphatase   62


 


Total Protein   7.6


 


Albumin   4.5


 


Globulin   3.1


 


Albumin/Globulin Ratio   1.5














Assessment & Plan


(1) Unstable angina


Assessment and Plan: 


We were called for second opinion regarding CABG with AVR vs PCI vs medical 

management. We reviewed films and examined patient. Discussed with patient and 

family. In our opinion best option is CABG with AVR. Patient and family has 

agreed now. May proceed with transfer and surgery. 


Status: Acute

## 2018-11-14 VITALS — OXYGEN SATURATION: 99 % | SYSTOLIC BLOOD PRESSURE: 140 MMHG | DIASTOLIC BLOOD PRESSURE: 93 MMHG | TEMPERATURE: 97.9 F

## 2018-11-14 VITALS — HEART RATE: 76 BPM

## 2018-11-14 RX ADMIN — ENOXAPARIN SODIUM SCH MG: 60 INJECTION SUBCUTANEOUS at 09:38

## 2018-11-14 RX ADMIN — PANTOPRAZOLE SODIUM SCH MG: 40 TABLET, DELAYED RELEASE ORAL at 09:36

## 2018-11-14 NOTE — CARD
--------------- APPROVED REPORT --------------





Date of service: 11/10/2018



EXAM: Two-dimensional and M-mode echocardiogram with Doppler and 

color Doppler.



INDICATION

Chest Pain 



2D DIMENSIONS 

IVSd1.7   (0.7-1.1cm)Aortic Root (2D)2.2   (2.0-3.7cm)

LVDd4.4   (3.9-5.9cm)LVOT Diameter2.1   (1.8-2.4cm)

PWd1.7   (0.7-1.1cm)LA Blzzbf45   (18-58mL)

LVDs2.8   (2.5-4.0cm)FS (%) 36.9   %

LVEF (%)67.1   (>50%)IVC0.00 cm



M-Mode DIMENSIONS 

Left Atrium (MM)3.81   (2.5-4.0cm)IVSd1.42   (0.7-1.1cm)

Aortic Root2.34   (2.2-3.7cm)LVDd5.07   (4.0-5.6cm)

Aortic Cusp Exc.1.40   (1.5-2.0cm)PWd1.11   (0.7-1.1cm)

FS (%) 45   %LVDs2.78   (2.0-3.8cm)

LVEF (%)60   (>50%)



Aortic Valve

AoV Peak Dnuwfaxc067.3cm/sAoV HVA826.0cmAO Peak GR.69mmHg

AO Mean GR.44mmHg



Mitral Valve

MV E Byrnhnts15.1cm/sMV A Hsdkaujk32.8cm/sE/A ratio1.1



TDI

Lateral E' Peak V6.16cm/sMedial E' Peak V3.71cm/sE/Lateral E'13.7

E/Medial E'22.7



Tricuspid Valve

TR Peak Mxwuqjlx918xj/sTR Peak Gr.16ywYkOBNI93cqRv



 LEFT VENTRICLE 

The left ventricle is normal size.

There is mild to moderate concentric left ventricular hypertrophy.

The left ventricular function is normal.

The left ventricular ejection fraction is within the normal range.

There is normal LV segmental wall motion.

The left ventricular diastolic function is normal.



 RIGHT VENTRICLE 

The right ventricle is normal size.



 ATRIA 

The left atrium size is normal.

The right atrium size is normal.



 AORTIC VALVE 

There is trace aortic regurgitation.

There is severe valvular aortic stenosis.



 MITRAL VALVE 

Mitral regurgitation is trace to mild.



 TRICUSPID VALVE 

There is mild tricuspid regurgitation.



<Conclusion>

Normal LV systolic function.

Normal chamber size.

Severe Aortic stenosis.

Trace to mild MR.

Mild TR.

## 2018-11-14 NOTE — CP.PCM.PN
Subjective





- Date & Time of Evaluation


Date of Evaluation: 11/14/18


Time of Evaluation: 08:10





- Subjective


Subjective: 





Pt awaiting transfer; No CP at rest, no cough, no n/v, no cough





Objective





- Vital Signs/Intake and Output


Vital Signs (last 24 hours): 


                                        











Temp Pulse Resp BP Pulse Ox


 


 97.9 F   85   20   140/93 H  99 


 


 11/14/18 07:00  11/14/18 07:33  11/14/18 07:00  11/14/18 07:54  11/14/18 07:00








Intake and Output: 


                                        











 11/14/18 11/14/18





 06:59 18:59


 


Output Total 300 


 


Balance -300 














- Medications


Medications: 


                               Current Medications





Aspirin (Ecotrin)  81 mg PO DAILY [facility]


   Last Admin: 11/13/18 10:56 Dose:  81 mg


Enoxaparin Sodium (Lovenox)  60 mg SC Q12 [facility]


   Last Admin: 11/13/18 21:23 Dose:  60 mg


Metoprolol Tartrate (Lopressor)  25 mg PO BIDAC [facility]


   Last Admin: 11/14/18 07:54 Dose:  25 mg


Pantoprazole Sodium (Protonix Ec Tab)  40 mg PO DAILY [facility]


   Last Admin: 11/13/18 10:56 Dose:  40 mg


Rosuvastatin Calcium (Crestor)  20 mg PO HS [facility]


   Last Admin: 11/13/18 21:23 Dose:  20 mg











- Labs


Labs: 


                                        





                                 11/13/18 05:03 





                                 11/13/18 05:03 





                                        











PT  11.1 SECONDS (9.7-12.2)   11/12/18  07:14    


 


INR  1.0   11/12/18  07:14    














- Constitutional


Appears: No Acute Distress





- Eye Exam


Eye Exam: Normal appearance





- ENT Exam


ENT Exam: Mucous Membranes Moist





- Neck Exam


Neck Exam: Full ROM.  absent: Lymphadenopathy, Normal Inspection





- Cardiovascular Exam


Cardiovascular Exam: REGULAR RHYTHM, +S1, +S2, Murmur.  absent: Gallop, JVD





- GI/Abdominal Exam


GI & Abdominal Exam: Soft.  absent: Tenderness, Normal Bowel Sounds





- Extremities Exam


Extremities Exam: Full ROM, Normal Capillary Refill.  absent: Calf Tenderness, 

Joint Swelling, Pedal Edema





Assessment and Plan





- Assessment and Plan (Free Text)


Assessment: 





Severe Aortic stenosis; CAD (Triple vssl w/ L main coronary)





For surgery


Cont meds

## 2018-11-15 NOTE — CARD
--------------- APPROVED REPORT --------------





Date of service: 11/09/2018



EKG Measurement

Heart [id number]

MT [id number]

[id number]

[id number]

[id number]



<Conclusion>

Normal sinus rhythm

Possible Left atrial enlargement

Left ventricular hypertrophy

Anteroseptal infarct, age undetermined

ST & T wave abnormality, consider lateral ischemia

Abnormal ECG

## 2018-11-17 NOTE — CP.PCM.PN
Subjective





- Date & Time of Evaluation


Date of Evaluation: 11/13/18


Time of Evaluation: 08:15





- Subjective


Subjective: 





Patient seen and evaluated


Denies chest pain and dyspnea





 Review Of Systems 


Constitutional: Negative for: Fever, Chills


Cardiovascular: Positive for: Chest Pain.  Negative for: Orthopnea


Respiratory: Negative for: Cough, Shortness of Breath


Gastrointestinal: Negative for: Nausea, Vomiting, Hematochezia





 Physical Exam 





- Physical Exam


Appears: Non-toxic, No Acute Distress


Skin: Warm, Dry, No Rash


Head: Atraumatic, Normacephalic


Eye(s): bilateral: Normal Inspection


Oral Mucosa: Moist


Neck: Supple


Cardiovascular: No Edema, Murmur (systolic)


Respiratory: Normal Breath Sounds, No Rales, No Rhonchi, No Wheezing


Gastrointestinal/Abdominal: Soft, No Tenderness, No Guarding, No Rebound


Extremity: Normal ROM, No Pedal Edema, Capillary Refill (<2 seconds)


Neurological/Psych: Oriented x3, Normal Speech (Speaking in full sentences), 

Normal Cognition





Assessment and Plan





- Assessment and Plan (Free Text)


Assessment: 





Severe symptomatic AS


CT angio negative for Aortic aneurysm


Hyperlipidemia





Patient s/p cardiac Cath





1. Severe Left Main and Triple vessel disease


2. Severe AS by ECHO





Recommend urgent CABG and SAVR


For transfer to [facility]








Objective





- Vital Signs/Intake and Output


Vital Signs (last 24 hours): 


                                        











Temp Pulse Resp BP Pulse Ox


 


 97.9 F   76   20   140/93 H  99 


 


 11/14/18 07:00  11/14/18 12:00  11/14/18 07:00  11/14/18 07:54  11/14/18 07:00











- Labs


Labs: 


                                        





                                 11/13/18 05:03 





                                 11/13/18 05:03 





                                        











PT  11.1 SECONDS (9.7-12.2)   11/12/18  07:14    


 


INR  1.0   11/12/18  07:14

## 2018-11-26 NOTE — CP.PCM.DIS
Provider





- Provider


Date of Admission: 


11/09/18 17:27


CC: chest pain





54 y/o male with no significant PMH> Patient seen in [facility] c/o physical.


Pt noted with loud murmur & EKG done was abnormal. Pt advise to see Cardio.


He was seen in Cardiology and admitted he has been having chest pain.


Pt sent to ER and was admitted.


Attending physician: 


Rizwan Block MD





Time Spent in preparation of Discharge (in minutes): 11





Hospital Course





- Lab Results


Lab Results: 


                                  Micro Results





11/14/18 02:17   Nose   MRSA Culture - Final


                            MRSA NOT DETECTED


11/12/18 21:26   Naris   MRSA Culture (Admit) - Final


                            MRSA NOT DETECTED





                             Most Recent Lab Values











WBC  7.1 K/uL (4.8-10.8)   11/13/18  05:03    


 


RBC  5.21 Mil/uL (4.40-5.90)   11/13/18  05:03    


 


Hgb  14.6 g/dL (12.0-18.0)   11/13/18  05:03    


 


Hct  43.5 % (35.0-51.0)   11/13/18  05:03    


 


MCV  83.4 fL (80.0-94.0)   11/13/18  05:03    


 


MCH  28.0 pg (27.0-31.0)   11/13/18  05:03    


 


MCHC  33.5 g/dL (33.0-37.0)   11/13/18  05:03    


 


RDW  13.7 % (11.5-14.5)   11/13/18  05:03    


 


Plt Count  220 K/uL (130-400)   11/13/18  05:03    


 


MPV  8.3 fL (7.2-11.7)   11/13/18  05:03    


 


Neut % (Auto)  52.7 % (50.0-75.0)   11/09/18  16:00    


 


Lymph % (Auto)  33.9 % (20.0-40.0)   11/09/18  16:00    


 


Mono % (Auto)  6.9 % (0.0-10.0)   11/09/18  16:00    


 


Eos % (Auto)  6.0 % (0.0-4.0)  H  11/09/18  16:00    


 


Baso % (Auto)  0.5 % (0.0-2.0)   11/09/18  16:00    


 


Neut # (Auto)  3.5 K/uL (1.8-7.0)   11/09/18  16:00    


 


Lymph # (Auto)  2.3 K/uL (1.0-4.3)   11/09/18  16:00    


 


Mono # (Auto)  0.5 K/uL (0.0-0.8)   11/09/18  16:00    


 


Eos # (Auto)  0.4 K/uL (0.0-0.7)   11/09/18  16:00    


 


Baso # (Auto)  0.0 K/uL (0.0-0.2)   11/09/18  16:00    


 


PT  11.1 SECONDS (9.7-12.2)   11/12/18  07:14    


 


INR  1.0   11/12/18  07:14    


 


Sodium  138 mmol/L (132-148)   11/13/18  05:03    


 


Potassium  3.9 mmol/L (3.6-5.2)   11/13/18  05:03    


 


Chloride  101 mmol/L ()   11/13/18  05:03    


 


Carbon Dioxide  28 mmol/L (22-30)   11/13/18  05:03    


 


Anion Gap  13  (10-20)   11/13/18  05:03    


 


BUN  16 mg/dL (9-20)   11/13/18  05:03    


 


Creatinine  0.8 mg/dL (0.8-1.5)   11/13/18  05:03    


 


Est GFR ( Amer)  > 60   11/13/18  05:03    


 


Est GFR (Non-Af Amer)  > 60   11/13/18  05:03    


 


Random Glucose  92 mg/dL ()   11/13/18  05:03    


 


Calcium  9.1 mg/dl (8.6-10.4)   11/13/18  05:03    


 


Phosphorus  3.9 mg/dL (2.5-4.5)   11/13/18  05:03    


 


Magnesium  2.2 mg/dL (1.6-2.3)   11/13/18  05:03    


 


Total Bilirubin  0.9 mg/dL (0.2-1.3)   11/13/18  05:03    


 


AST  28 U/L (17-59)   11/13/18  05:03    


 


ALT  32 U/L (21-72)   11/13/18  05:03    


 


Alkaline Phosphatase  62 U/L ()   11/13/18  05:03    


 


Troponin I  0.0340 ng/mL (0.00-0.120)   11/11/18  07:47    


 


NT-Pro-B Natriuret Pep  681 pg/mL (0-900)   11/09/18  16:00    


 


Total Protein  7.6 g/dL (6.3-8.3)   11/13/18  05:03    


 


Albumin  4.5 g/dL (3.5-5.0)   11/13/18  05:03    


 


Globulin  3.1 gm/dL (2.2-3.9)   11/13/18  05:03    


 


Albumin/Globulin Ratio  1.5  (1.0-2.1)   11/13/18  05:03    


 


Triglycerides  107 mg/dL (0-149)  D 11/11/18  07:47    


 


Cholesterol  199 mg/dL (0-199)   11/11/18  07:47    


 


LDL Cholesterol Direct  144 mg/dL (0-129)  H  11/11/18  07:47    


 


HDL Cholesterol  38 mg/dL (30-70)   11/11/18  07:47    














- Hospital Course


Hospital Course: 





Pt was admitted and work up showed severe aortic stenosis.


Pt had cardiac cath and showed 95 % stenosis of left main coronary & 2 other 

vessels.


Pt transferred to Tertiary hosp.





Discharge Exam





- Head Exam


Head Exam: NORMOCEPHALIC





- Eye Exam


Eye Exam: Normal appearance





- ENT Exam


ENT Exam: Mucous Membranes Moist





- Neck Exam


Neck exam: Full Rom





- Respiratory Exam


Respiratory Exam: Clear to PA & Lateral.  absent: Rales, Rhonchi, Wheezes





- Cardiovascular Exam


Cardiovascular Exam: REGULAR RHYTHM, +S1, +S2, Systolic Murmur.  absent: Gallop,

JVD





- GI/Abdominal Exam


GI & Abdominal Exam: Soft.  absent: Rebound, Tenderness





- Extremities Exam


Extremities exam: full ROM, normal capillary refill, pedal pulses present





- Neurological Exam


Neurological exam: Alert, CN II-XII Intact, Normal Gait, Oriented x3





Discharge Plan





- Follow Up Plan


Condition: GOOD


Disposition: Trans to Other Acute Care Hosp


Instructions:  Metoprolol